# Patient Record
Sex: MALE | Race: WHITE | NOT HISPANIC OR LATINO | URBAN - METROPOLITAN AREA
[De-identification: names, ages, dates, MRNs, and addresses within clinical notes are randomized per-mention and may not be internally consistent; named-entity substitution may affect disease eponyms.]

---

## 2020-09-02 ENCOUNTER — OFFICE VISIT (OUTPATIENT)
Dept: URBAN - METROPOLITAN AREA CLINIC 57 | Facility: CLINIC | Age: 50
End: 2020-09-02

## 2020-10-19 ENCOUNTER — OFFICE VISIT (OUTPATIENT)
Dept: URBAN - METROPOLITAN AREA MEDICAL CENTER 7 | Facility: MEDICAL CENTER | Age: 50
End: 2020-10-19

## 2020-11-04 ENCOUNTER — OFFICE VISIT (OUTPATIENT)
Dept: URBAN - METROPOLITAN AREA CLINIC 57 | Facility: CLINIC | Age: 50
End: 2020-11-04

## 2020-12-09 ENCOUNTER — OFFICE VISIT (OUTPATIENT)
Dept: URBAN - METROPOLITAN AREA MEDICAL CENTER 7 | Facility: MEDICAL CENTER | Age: 50
End: 2020-12-09

## 2021-05-19 ENCOUNTER — OFFICE VISIT (OUTPATIENT)
Dept: URBAN - METROPOLITAN AREA MEDICAL CENTER 7 | Facility: MEDICAL CENTER | Age: 51
End: 2021-05-19

## 2021-06-02 ENCOUNTER — OFFICE VISIT (OUTPATIENT)
Dept: URBAN - METROPOLITAN AREA CLINIC 57 | Facility: CLINIC | Age: 51
End: 2021-06-02

## 2021-09-29 ENCOUNTER — OFFICE VISIT (OUTPATIENT)
Dept: URBAN - METROPOLITAN AREA CLINIC 57 | Facility: CLINIC | Age: 51
End: 2021-09-29

## 2022-06-08 ENCOUNTER — OFFICE VISIT (OUTPATIENT)
Dept: URBAN - METROPOLITAN AREA CLINIC 57 | Facility: CLINIC | Age: 52
End: 2022-06-08

## 2022-07-09 ENCOUNTER — TELEPHONE ENCOUNTER (OUTPATIENT)
Dept: URBAN - METROPOLITAN AREA CLINIC 121 | Facility: CLINIC | Age: 52
End: 2022-07-09

## 2022-07-09 RX ORDER — OMEPRAZOLE 40 MG/1
CAPSULE, DELAYED RELEASE ORAL ONCE A DAY
Refills: 0 | OUTPATIENT
Start: 2021-09-29 | End: 2022-06-08

## 2022-07-09 RX ORDER — PREDNISONE 20 MG/1
TABLET ORAL ONCE A DAY
Refills: 0 | OUTPATIENT
Start: 2021-09-29 | End: 2022-06-08

## 2022-07-09 RX ORDER — BUDESONIDE AND FORMOTEROL FUMARATE DIHYDRATE 80; 4.5 UG/1; UG/1
AEROSOL RESPIRATORY (INHALATION) ONCE A DAY
Refills: 0 | OUTPATIENT
Start: 2021-09-29 | End: 2022-06-08

## 2022-07-09 RX ORDER — BROMOCRIPTINE MESYLATE 2.5 MG/1
TABLET ORAL ONCE A DAY
Refills: 0 | OUTPATIENT
Start: 2021-09-29 | End: 2022-06-08

## 2022-07-09 RX ORDER — ROSUVASTATIN CALCIUM 10 MG/1
TABLET, FILM COATED ORAL ONCE A DAY
Refills: 0 | OUTPATIENT
Start: 2021-09-29 | End: 2022-06-08

## 2022-07-09 RX ORDER — TRAMADOL HYDROCHLORIDE 50 MG/1
TABLET ORAL ONCE A DAY
Refills: 0 | OUTPATIENT
Start: 2021-09-29 | End: 2022-06-08

## 2022-07-09 RX ORDER — CLOMIPRAMINE HYDROCHLORIDE 25 MG/1
CAPSULE ORAL
Refills: 0 | OUTPATIENT
Start: 2020-09-02 | End: 2020-09-02

## 2022-07-09 RX ORDER — CLOMIPHENE CITRATE 50 MG/1
TABLET ORAL
Refills: 0 | OUTPATIENT
Start: 2020-09-02 | End: 2022-06-08

## 2022-07-09 RX ORDER — CLOMIPHENE CITRATE 50 MG/1
TABLET ORAL ONCE A DAY
Refills: 0 | OUTPATIENT
Start: 2021-09-29 | End: 2022-06-08

## 2022-07-10 ENCOUNTER — TELEPHONE ENCOUNTER (OUTPATIENT)
Dept: URBAN - METROPOLITAN AREA CLINIC 121 | Facility: CLINIC | Age: 52
End: 2022-07-10

## 2022-07-10 RX ORDER — LISINOPRIL 5 MG/1
TABLET ORAL ONCE A DAY
Refills: 0 | Status: ACTIVE | COMMUNITY
Start: 2022-06-08

## 2022-07-10 RX ORDER — CLOMIPHENE CITRATE 50 MG/1
TABLET ORAL ONCE A DAY
Refills: 0 | Status: ACTIVE | COMMUNITY
Start: 2022-06-08

## 2022-07-10 RX ORDER — MV-MIN/FOLIC/VIT K/LYCOP/COQ10 200-100MCG
CAPSULE ORAL ONCE A DAY
Refills: 0 | Status: ACTIVE | COMMUNITY
Start: 2022-06-08

## 2022-07-12 ENCOUNTER — OFFICE VISIT (OUTPATIENT)
Dept: URBAN - METROPOLITAN AREA SURGERY CENTER 4 | Facility: SURGERY CENTER | Age: 52
End: 2022-07-12

## 2022-08-03 ENCOUNTER — OFFICE VISIT (OUTPATIENT)
Dept: URBAN - METROPOLITAN AREA CLINIC 57 | Facility: CLINIC | Age: 52
End: 2022-08-03

## 2022-08-15 ENCOUNTER — TELEPHONE ENCOUNTER (OUTPATIENT)
Dept: URBAN - METROPOLITAN AREA CLINIC 63 | Facility: CLINIC | Age: 52
End: 2022-08-15

## 2022-08-18 ENCOUNTER — CLAIMS CREATED FROM THE CLAIM WINDOW (OUTPATIENT)
Dept: URBAN - METROPOLITAN AREA SURGERY CENTER 4 | Facility: SURGERY CENTER | Age: 52
End: 2022-08-18
Payer: COMMERCIAL

## 2022-08-18 DIAGNOSIS — Z86.010 COLON POLYP HISTORY: ICD-10-CM

## 2022-08-18 DIAGNOSIS — K44.9 DIAPHRAGMATIC HERNIA WITHOUT OBSTRUCTION OR GANGRENE: ICD-10-CM

## 2022-08-18 DIAGNOSIS — D12.7 BENIGN NEOPLASM OF RECTOSIGMOID JUNCTION: ICD-10-CM

## 2022-08-18 DIAGNOSIS — K29.00 ACUTE EROSIVE GASTRITIS: ICD-10-CM

## 2022-08-18 DIAGNOSIS — D12.2 POLYP OF ASCENDING COLON: ICD-10-CM

## 2022-08-18 DIAGNOSIS — D12.4 POLYP OF DESCENDING COLON: ICD-10-CM

## 2022-08-18 DIAGNOSIS — K22.2 SCHATZKI'S RING: ICD-10-CM

## 2022-08-18 DIAGNOSIS — R13.10 DYSPHAGIA: ICD-10-CM

## 2022-08-18 PROCEDURE — 43239 EGD BIOPSY SINGLE/MULTIPLE: CPT | Performed by: INTERNAL MEDICINE

## 2022-08-18 PROCEDURE — 45385 COLONOSCOPY W/LESION REMOVAL: CPT | Performed by: INTERNAL MEDICINE

## 2022-08-18 PROCEDURE — G8918 PT W/O PREOP ORDER IV AB PRO: HCPCS | Performed by: INTERNAL MEDICINE

## 2022-08-18 PROCEDURE — 43248 EGD GUIDE WIRE INSERTION: CPT | Performed by: INTERNAL MEDICINE

## 2022-08-18 PROCEDURE — G8907 PT DOC NO EVENTS ON DISCHARG: HCPCS | Performed by: INTERNAL MEDICINE

## 2022-08-18 RX ORDER — LISINOPRIL 5 MG/1
TABLET ORAL ONCE A DAY
Refills: 0 | Status: ACTIVE | COMMUNITY
Start: 2022-06-08

## 2022-08-18 RX ORDER — CLOMIPHENE CITRATE 50 MG/1
TABLET ORAL ONCE A DAY
Refills: 0 | Status: ACTIVE | COMMUNITY
Start: 2022-06-08

## 2022-08-18 RX ORDER — MV-MIN/FOLIC/VIT K/LYCOP/COQ10 200-100MCG
CAPSULE ORAL ONCE A DAY
Refills: 0 | Status: ACTIVE | COMMUNITY
Start: 2022-06-08

## 2022-08-19 ENCOUNTER — TELEPHONE ENCOUNTER (OUTPATIENT)
Dept: URBAN - METROPOLITAN AREA CLINIC 63 | Facility: CLINIC | Age: 52
End: 2022-08-19

## 2022-08-21 ENCOUNTER — TELEPHONE ENCOUNTER (OUTPATIENT)
Dept: URBAN - METROPOLITAN AREA CLINIC 63 | Facility: CLINIC | Age: 52
End: 2022-08-21

## 2022-08-21 PROBLEM — 40739000 DYSPHAGIA: Status: ACTIVE | Noted: 2022-08-21

## 2022-08-29 PROBLEM — 428283002 HISTORY OF POLYP OF COLON: Status: ACTIVE | Noted: 2022-06-30

## 2022-08-29 PROBLEM — 288939007 DIFFICULTY IN SWALLOWING: Status: ACTIVE | Noted: 2022-06-30

## 2022-08-31 ENCOUNTER — CLAIMS CREATED FROM THE CLAIM WINDOW (OUTPATIENT)
Dept: URBAN - METROPOLITAN AREA CLINIC 57 | Facility: CLINIC | Age: 52
End: 2022-08-31
Payer: COMMERCIAL

## 2022-08-31 ENCOUNTER — WEB ENCOUNTER (OUTPATIENT)
Dept: URBAN - METROPOLITAN AREA CLINIC 57 | Facility: CLINIC | Age: 52
End: 2022-08-31

## 2022-08-31 VITALS
WEIGHT: 211.2 LBS | DIASTOLIC BLOOD PRESSURE: 91 MMHG | SYSTOLIC BLOOD PRESSURE: 146 MMHG | HEIGHT: 74 IN | BODY MASS INDEX: 27.11 KG/M2 | HEART RATE: 64 BPM | OXYGEN SATURATION: 100 % | TEMPERATURE: 98.1 F

## 2022-08-31 DIAGNOSIS — K44.9 HIATAL HERNIA: ICD-10-CM

## 2022-08-31 DIAGNOSIS — K29.60 EROSIVE GASTRITIS: ICD-10-CM

## 2022-08-31 DIAGNOSIS — K64.0 GRADE I HEMORRHOIDS: ICD-10-CM

## 2022-08-31 DIAGNOSIS — D12.6 SERRATED ADENOMA OF COLON: ICD-10-CM

## 2022-08-31 DIAGNOSIS — K22.2 LOWER ESOPHAGEAL RING (SCHATZKI): ICD-10-CM

## 2022-08-31 PROBLEM — 1086791000119100 EROSIVE GASTRITIS: Status: ACTIVE | Noted: 2022-08-31

## 2022-08-31 PROBLEM — 63305008 STRICTURE OF ESOPHAGUS: Status: ACTIVE | Noted: 2022-08-31

## 2022-08-31 PROCEDURE — 99214 OFFICE O/P EST MOD 30 MIN: CPT | Performed by: NURSE PRACTITIONER

## 2022-08-31 RX ORDER — ESOMEPRAZOLE MAGNESIUM 20 MG/1
1 CAPSULE CAPSULE, DELAYED RELEASE ORAL ONCE A DAY
Status: ACTIVE | COMMUNITY

## 2022-08-31 RX ORDER — ATORVASTATIN CALCIUM 10 MG/1
TAKE 1 TABLET BY MOUTH EVERY DAY TABLET, FILM COATED ORAL
Qty: 90 EACH | Refills: 0 | Status: ACTIVE | COMMUNITY

## 2022-08-31 RX ORDER — MV-MIN/FOLIC/VIT K/LYCOP/COQ10 200-100MCG
CAPSULE ORAL ONCE A DAY
Refills: 0 | Status: ACTIVE | COMMUNITY
Start: 2022-06-08

## 2022-08-31 RX ORDER — LISINOPRIL 5 MG/1
TABLET ORAL ONCE A DAY
Refills: 0 | Status: ACTIVE | COMMUNITY
Start: 2022-06-08

## 2022-08-31 RX ORDER — ESOMEPRAZOLE MAGNESIUM 40 MG/1
1 CAPSULE CAPSULE, DELAYED RELEASE ORAL ONCE A DAY
Qty: 30 | Refills: 1 | OUTPATIENT
Start: 2022-08-31

## 2022-08-31 NOTE — HPI-TODAY'S VISIT:
Mr. Vasques is a pleasant 52-year-old male evaluated in follow-up of upper endoscopy and colonoscopy.  He was previously evaluated 06/08/2022 with complaint of dysphagia.  Would take Nexium for one week, about every 3-4 months.   Admitted consuming 3-4 beers once weekly.      Started having EGDs age 39 after food bolus impaction.  History of esophageal dilations lasting about 1-2 years.

## 2022-08-31 NOTE — HPI-PREVIOUS PROCEDURES
Upper endoscopy 08/18/2022 at [facility] findings: 2 cm hiatal hernia. Acute erosive gastritis. Normal examined duodenum. Esophageal mucosal changes suggestive of eosinophilic esophagitis. Mild Schatzki ring that was biopsied and dilated. Pathology findings: Stomach biopsy finding of body and antral-type mucosa, H. pylori negative. Lower esophagus having reflux esophagitis, negative for intestinal metaplasia or fungal organisms. Upper esophagus having squamous mucosa with up to 5 eosinophils per hpf consistent with reflux esophagitis.  Colonoscopy 08/18/2022 at [facility] findings: Small nonbleeding internal hemorrhoids. A 9 mm sessile serrated adenoma polyp was removed in the ascending colon. Two 4-5 mm sessile serrated adenoma polyps were removed in the descending colon. A 10 mm sessile serrated adenoma polyp was removed to the rectosigmoid colon. Surveillance colonoscopy to be completed in 3 years.
Yes

## 2024-05-02 ENCOUNTER — DASHBOARD ENCOUNTERS (OUTPATIENT)
Age: 54
End: 2024-05-02

## 2024-05-10 ENCOUNTER — OFFICE VISIT (OUTPATIENT)
Dept: URBAN - METROPOLITAN AREA CLINIC 57 | Facility: CLINIC | Age: 54
End: 2024-05-10

## 2024-05-10 RX ORDER — ROSUVASTATIN CALCIUM 5 MG/1
TABLET, FILM COATED ORAL
Qty: 30 TABLET | Status: ACTIVE | COMMUNITY

## 2024-05-10 RX ORDER — ONDANSETRON HYDROCHLORIDE 4 MG/1
TAKE 1 TABLET EVERY 4-6 HOURS AS NEEDED NAUSEA TABLET, FILM COATED ORAL
Qty: 15 EACH | Refills: 0 | Status: ACTIVE | COMMUNITY